# Patient Record
Sex: FEMALE | Race: BLACK OR AFRICAN AMERICAN
[De-identification: names, ages, dates, MRNs, and addresses within clinical notes are randomized per-mention and may not be internally consistent; named-entity substitution may affect disease eponyms.]

---

## 2020-10-12 ENCOUNTER — HOSPITAL ENCOUNTER (OUTPATIENT)
Dept: HOSPITAL 5 - TRG | Age: 20
Setting detail: OBSERVATION
LOS: 1 days | Discharge: HOME | End: 2020-10-13
Attending: OBSTETRICS & GYNECOLOGY | Admitting: OBSTETRICS & GYNECOLOGY
Payer: COMMERCIAL

## 2020-10-12 DIAGNOSIS — O46.8X3: Primary | ICD-10-CM

## 2020-10-12 DIAGNOSIS — Z3A.36: ICD-10-CM

## 2020-10-12 PROCEDURE — 85730 THROMBOPLASTIN TIME PARTIAL: CPT

## 2020-10-12 PROCEDURE — 85025 COMPLETE CBC W/AUTO DIFF WBC: CPT

## 2020-10-12 PROCEDURE — 86900 BLOOD TYPING SEROLOGIC ABO: CPT

## 2020-10-12 PROCEDURE — 36415 COLL VENOUS BLD VENIPUNCTURE: CPT

## 2020-10-12 PROCEDURE — 86850 RBC ANTIBODY SCREEN: CPT

## 2020-10-12 PROCEDURE — 86901 BLOOD TYPING SEROLOGIC RH(D): CPT

## 2020-10-12 PROCEDURE — 96361 HYDRATE IV INFUSION ADD-ON: CPT

## 2020-10-12 PROCEDURE — G0378 HOSPITAL OBSERVATION PER HR: HCPCS

## 2020-10-12 PROCEDURE — 96360 HYDRATION IV INFUSION INIT: CPT

## 2020-10-12 PROCEDURE — 85610 PROTHROMBIN TIME: CPT

## 2020-10-12 PROCEDURE — 80048 BASIC METABOLIC PNL TOTAL CA: CPT

## 2020-10-12 PROCEDURE — 85384 FIBRINOGEN ACTIVITY: CPT

## 2020-10-12 PROCEDURE — 96374 THER/PROPH/DIAG INJ IV PUSH: CPT

## 2020-10-12 PROCEDURE — 85460 HEMOGLOBIN FETAL: CPT

## 2020-10-13 VITALS — SYSTOLIC BLOOD PRESSURE: 120 MMHG | DIASTOLIC BLOOD PRESSURE: 78 MMHG

## 2020-10-13 LAB
APTT BLD: 28.7 SEC. (ref 24.2–36.6)
BASOPHILS # (AUTO): 0.1 K/MM3 (ref 0–0.1)
BASOPHILS NFR BLD AUTO: 0.6 % (ref 0–1.8)
BUN SERPL-MCNC: 5 MG/DL (ref 7–17)
BUN/CREAT SERPL: 13 %
CALCIUM SERPL-MCNC: 8.7 MG/DL (ref 8.4–10.2)
EOSINOPHIL # BLD AUTO: 0.7 K/MM3 (ref 0–0.4)
EOSINOPHIL NFR BLD AUTO: 6.8 % (ref 0–4.3)
FIBRINOGEN PPP-MCNC: 470 MG/DL (ref 211–480)
HCT VFR BLD CALC: 35.1 % (ref 30.3–42.9)
HEMOLYSIS INDEX: 0
HGB BLD-MCNC: 11.8 GM/DL (ref 10.1–14.3)
INR PPP: 0.99 (ref 0.87–1.13)
LYMPHOCYTES # BLD AUTO: 1.7 K/MM3 (ref 1.2–5.4)
LYMPHOCYTES NFR BLD AUTO: 17 % (ref 13.4–35)
MCHC RBC AUTO-ENTMCNC: 34 % (ref 30–34)
MCV RBC AUTO: 87 FL (ref 79–97)
MONOCYTES # (AUTO): 0.9 K/MM3 (ref 0–0.8)
MONOCYTES % (AUTO): 9.1 % (ref 0–7.3)
PLATELET # BLD: 226 K/MM3 (ref 140–440)
RBC # BLD AUTO: 4.05 M/MM3 (ref 3.65–5.03)

## 2020-10-13 RX ADMIN — SODIUM CHLORIDE, SODIUM LACTATE, POTASSIUM CHLORIDE, AND CALCIUM CHLORIDE SCH MLS/HR: .6; .31; .03; .02 INJECTION, SOLUTION INTRAVENOUS at 02:02

## 2020-10-13 RX ADMIN — SODIUM CHLORIDE, SODIUM LACTATE, POTASSIUM CHLORIDE, AND CALCIUM CHLORIDE SCH MLS/HR: .6; .31; .03; .02 INJECTION, SOLUTION INTRAVENOUS at 09:29

## 2020-10-13 NOTE — EVENT NOTE
Date: 10/13/20





OBS event note





Patient mated overnight for vaginal bleeding in third trimester.  Vaginal 

bleeding has since stopped.  Abruption labs including Kleihauer-Betke was 

negative.  Patient was discharged home in good condition.

## 2020-10-18 ENCOUNTER — HOSPITAL ENCOUNTER (INPATIENT)
Dept: HOSPITAL 5 - TRG | Age: 20
LOS: 3 days | Discharge: HOME | End: 2020-10-21
Attending: OBSTETRICS & GYNECOLOGY | Admitting: OBSTETRICS & GYNECOLOGY
Payer: COMMERCIAL

## 2020-10-18 DIAGNOSIS — Z23: ICD-10-CM

## 2020-10-18 DIAGNOSIS — D62: ICD-10-CM

## 2020-10-18 DIAGNOSIS — Z3A.37: ICD-10-CM

## 2020-10-18 DIAGNOSIS — Z20.828: ICD-10-CM

## 2020-10-18 LAB
ALBUMIN SERPL-MCNC: 4 G/DL (ref 3.9–5)
ALT SERPL-CCNC: 6 UNITS/L (ref 7–56)
BILIRUB UR QL STRIP: (no result)
BLOOD UR QL VISUAL: (no result)
BUN SERPL-MCNC: 10 MG/DL (ref 7–17)
BUN/CREAT SERPL: 20 %
CALCIUM SERPL-MCNC: 9.5 MG/DL (ref 8.4–10.2)
HCT VFR BLD CALC: 37 % (ref 30.3–42.9)
HEMOLYSIS INDEX: 41
HGB BLD-MCNC: 12.9 GM/DL (ref 10.1–14.3)
MCHC RBC AUTO-ENTMCNC: 35 % (ref 30–34)
MCV RBC AUTO: 87 FL (ref 79–97)
PH UR STRIP: 6 [PH] (ref 5–7)
PLATELET # BLD: 260 K/MM3 (ref 140–440)
PROT UR STRIP-MCNC: (no result) MG/DL
RBC # BLD AUTO: 4.26 M/MM3 (ref 3.65–5.03)
RBC #/AREA URNS HPF: 4 /HPF (ref 0–6)
UROBILINOGEN UR-MCNC: < 2 MG/DL (ref ?–2)
WBC #/AREA URNS HPF: 2 /HPF (ref 0–6)

## 2020-10-18 PROCEDURE — 80053 COMPREHEN METABOLIC PANEL: CPT

## 2020-10-18 PROCEDURE — 87806 HIV AG W/HIV1&2 ANTB W/OPTIC: CPT

## 2020-10-18 PROCEDURE — 83036 HEMOGLOBIN GLYCOSYLATED A1C: CPT

## 2020-10-18 PROCEDURE — 86850 RBC ANTIBODY SCREEN: CPT

## 2020-10-18 PROCEDURE — 86592 SYPHILIS TEST NON-TREP QUAL: CPT

## 2020-10-18 PROCEDURE — 81001 URINALYSIS AUTO W/SCOPE: CPT

## 2020-10-18 PROCEDURE — 85018 HEMOGLOBIN: CPT

## 2020-10-18 PROCEDURE — A6250 SKIN SEAL PROTECT MOISTURIZR: HCPCS

## 2020-10-18 PROCEDURE — 90715 TDAP VACCINE 7 YRS/> IM: CPT

## 2020-10-18 PROCEDURE — 83615 LACTATE (LD) (LDH) ENZYME: CPT

## 2020-10-18 PROCEDURE — 85014 HEMATOCRIT: CPT

## 2020-10-18 PROCEDURE — 36415 COLL VENOUS BLD VENIPUNCTURE: CPT

## 2020-10-18 PROCEDURE — 90471 IMMUNIZATION ADMIN: CPT

## 2020-10-18 PROCEDURE — 86803 HEPATITIS C AB TEST: CPT

## 2020-10-18 PROCEDURE — 85027 COMPLETE CBC AUTOMATED: CPT

## 2020-10-18 PROCEDURE — 86900 BLOOD TYPING SEROLOGIC ABO: CPT

## 2020-10-18 PROCEDURE — 84550 ASSAY OF BLOOD/URIC ACID: CPT

## 2020-10-18 PROCEDURE — 86706 HEP B SURFACE ANTIBODY: CPT

## 2020-10-18 PROCEDURE — 86762 RUBELLA ANTIBODY: CPT

## 2020-10-18 PROCEDURE — 86901 BLOOD TYPING SEROLOGIC RH(D): CPT

## 2020-10-18 RX ADMIN — SODIUM CHLORIDE, SODIUM LACTATE, POTASSIUM CHLORIDE, AND CALCIUM CHLORIDE SCH MLS/HR: .6; .31; .03; .02 INJECTION, SOLUTION INTRAVENOUS at 04:09

## 2020-10-18 RX ADMIN — Medication PRN APPLIC: at 10:22

## 2020-10-18 RX ADMIN — OXYCODONE AND ACETAMINOPHEN PRN TAB: 5; 325 TABLET ORAL at 17:00

## 2020-10-18 RX ADMIN — SODIUM CHLORIDE, SODIUM LACTATE, POTASSIUM CHLORIDE, AND CALCIUM CHLORIDE SCH MLS/HR: .6; .31; .03; .02 INJECTION, SOLUTION INTRAVENOUS at 10:22

## 2020-10-18 NOTE — ANESTHESIA DAY OF SURGERY
Anesthesia Day of Surgery





- Day of Surgery


Patient Examined: Yes


Patient H&P Reviewed: Yes


Patient is NPO: No (emergency)


Beta Blockers: No


Cardiac Clearance: No


Pulmonary Clearance: No


Juwan's Test: N/A

## 2020-10-18 NOTE — HISTORY AND PHYSICAL REPORT
<MARY NICHOLSON - Last Filed: 10/18/20 04:04>





History of Present Illness


Date of examination: 10/18/20


Date of admission: 


10/18/20 03:42





Chief complaint: 


Contractions with urge to push.





History of present illness: 


20 year old  at 37 weeks gestation presents to L&D with contractions and 

urge to push. Upon arrival to L&D patient is completely dilated and +1 station 

with BBOW.


Patient received prenatal care at [facility]; prenatal records are not 

available.


EDC 2020. 


No prenatal labs are available. Prenatal labs were drawn upon admission. 








Past History


Past Medical History: no pertinent history


Past Surgical History: no surgical history


GYN History: denies: chlamydia, gonorrhea, hepatitis B, hepatitis C, herpes, 

HIV, syphilis, trichomonas


Family/Genetic History: diabetes


Social history: lives with family, full code.  denies: smoking, alcohol abuse, 

IV drug use





- Obstetrical History


Expected Date of Delivery: 20


Actual Gestation: 37 Week(s) 0 Day(s) 


: 1


Para: 0


Hx # Term Pregnancies: 0


Number of  Pregnancies: 0


Spontaneous Abortions: 0


Induced : 0


Number of Living Children: 0





Medications and Allergies


                                    Allergies











Allergy/AdvReac Type Severity Reaction Status Date / Time


 


No Known Allergies Allergy   Verified 10/12/20 23:13











                                Home Medications











 Medication  Instructions  Recorded  Confirmed  Last Taken  Type


 


Prenatal One Daily Tablet 1 tab PO DAILY MDD 1 10/13/20 10/13/20 09/16/20 10:00 

History











Active Meds: 


Active Medications





Ephedrine Sulfate (Ephedrine Sulfate)  10 mg IV Q2M PRN


   PRN Reason: Hypotension


Lactated Ringer's (Lactated Ringers)  1,000 mls @ 125 mls/hr IV AS DIRECT SHEYLA


Oxytocin/Sodium Chloride (Pitocin/Ns 30 Unit/500ml)  30 units in 500 mls @ 40 

mls/hr IV TITR SHEYLA; Protocol


Ampicillin Sodium (Ampicillin/Ns 2 Gm/100 Ml)  2 gm in 100 mls @ 100 mls/hr IV 

ONCE ONE; Protocol


   Stop: 10/18/20 04:34


Mineral Oil (Mineral Oil)  30 ml PO QHS PRN


   PRN Reason: Constipation


Terbutaline Sulfate (Brethine)  0.25 mg SUB-Q ONCE PRN


   PRN Reason: Hyperstimulation/Hypertonicity











Review of Systems


All systems: negative (contractions and need to push)





- Vital Signs


Vital signs: 


                                   Vital Signs











Pulse Pulse Ox


 


 84   98 


 


 10/18/20 03:38  10/18/20 03:38








                                        











Temp Pulse Resp BP Pulse Ox


 


    86      140/94   98 


 


    10/18/20 03:39     10/18/20 03:39  10/18/20 03:38














- Physical Exam


Abdomen: Positive: normal appearance, soft.  Negative: distention, tenderness, 

guarding, rigidity


Genitourinary (Female): Positive: normal external genitalia, normal perenium.  

Negative: perineal/vulvar lesions (no lesions noted on careful exam with bright 

light upon admission)


Vagina: Positive: normal moisture


Uterus: Positive: normal size, enlarged.  Negative: tender


Anus/Rectum: Positive: normal perianal skin


Extremities: Positive: normal.  Negative: tenderness, edema





- Obstetrical


FHR: category 1


Uterine Contraction Monitor Mode: External


Cervical Dilatation: 10


Cervical Effacement Percentage: 100


Fetal station: +1


Uterine Contraction Pattern: Regular


Uterine Contraction Intensity: Strong/Firm





Results


All other labs normal.








Assessment and Plan


A: Pregnancy at 37 weeks gestation. 


Active advanced labor. 


GBS unknown. 


No prenatal records available. 


P: Admit. 


GBS prophylaxis. 


Request prenatal records. 


Draw prenatal labs


Anticipate vaginal birth. 








<BRENDANKATIEMARCELA ANYI - Last Filed: 10/18/20 05:52>





History of Present Illness


Date of admission: 


10/18/20 03:42








Medications and Allergies


Active Meds: 


Active Medications





Cefazolin Sodium (Ancef/Sterile Water 2 Gm/20 Ml)  2 gm IV PREOP NR


   Stop: 10/18/20 23:45


Diphenhydramine HCl (Benadryl)  12.5 mg IV Q2H PRN


   PRN Reason: Itching


Ephedrine Sulfate (Ephedrine Sulfate)  10 mg IV Q2M PRN


   PRN Reason: Hypotension


Hydromorphone HCl (Dilaudid)  0.5 mg IV Q4H PRN


   PRN Reason: breakthrough pain > 7/10


Lactated Ringer's (Lactated Ringers)  1,000 mls @ 125 mls/hr IV AS DIRECT SHEYLA


   Last Admin: 10/18/20 04:09 Dose:  125 mls/hr


   Documented by: 


Oxytocin/Sodium Chloride (Pitocin/Ns 30 Unit/500ml)  30 units in 500 mls @ 40 

mls/hr IV TITR SHEYLA; Protocol


Mineral Oil (Mineral Oil)  30 ml PO QHS PRN


   PRN Reason: Constipation


Nalbuphine HCl (Nalbuphine)  2.5 mg IV Q2H PRN


   PRN Reason: Itching


Naloxone HCl (Naloxone)  0.2 mg IV Q2MIN PRN


   PRN Reason: Res Rate </= 8 or 02 SAT < 92%


Ondansetron HCl (Zofran)  4 mg IV Q8H PRN


   PRN Reason: Nausea And Vomiting


Promethazine HCl (Phenergan)  25 mg PO Q6H PRN


   PRN Reason: Nausea And Vomiting


Promethazine HCl (Phenergan)  25 mg AL Q6H PRN


   PRN Reason: Nausea And Vomiting


Terbutaline Sulfate (Brethine)  0.25 mg SUB-Q ONCE PRN


   PRN Reason: Hyperstimulation/Hypertonicity











- Vital Signs


Vital signs: 


                                   Vital Signs











Pulse Pulse Ox


 


 84   98 


 


 10/18/20 03:38  10/18/20 03:38








                                        











Temp Pulse Resp BP Pulse Ox


 


 98.1 F   109 H     146/96   100 


 


 10/18/20 03:47  10/18/20 04:33     10/18/20 03:48  10/18/20 04:33














Results


Result Diagrams: 


                                10/18/20 Unknown





                                10/18/20 Unknown


                              Abnormal lab results











  10/18/20 10/18/20 Range/Units





  Unknown Unknown 


 


WBC  18.4 H   (4.5-11.0)  K/mm3


 


MCHC  35 H   (30-34)  %


 


RDW  13.1 L   (13.2-15.2)  %


 


Sodium   134 L  (137-145)  mmol/L


 


Chloride   97.8 L  ()  mmol/L


 


Carbon Dioxide   18 L  (22-30)  mmol/L


 


Creatinine   0.5 L  (0.6-1.2)  mg/dL


 


Glucose   102 H  ()  mg/dL


 


ALT   6 L  (7-56)  units/L


 


Alkaline Phosphatase   271 H  ()  units/L








All other labs normal.








Assessment and Plan





Pt upon ROM was found to be dimitri breech.  As a result, pt was verbally 

consulted by the staff and taken to the OR for emergency  for 

malpresentation.

## 2020-10-18 NOTE — ANESTHESIA CONSULTATION
Anesthesia Consult and Med Hx


Date of service: 10/18/20





- Airway


Anesthetic Teeth Evaluation: Good


ROM Head & Neck: Adequate


Mental/Hyoid Distance: Adequate


Mallampati Class: Class III


Intubation Access Assessment: Probably Good





- Pulmonary Exam


CTA: Yes





- Cardiac Exam


Cardiac Exam: RRR





- Pre-Operative Health Status


ASA Pre-Surgery Classification: ASA2, Emergency


Proposed Anesthetic Plan: General


Nerve Block: TAP





- Pulmonary


Hx Smoking: No


Hx Asthma: No


Hx Sleep Apnea: No





- Cardiovascular System


Hx Hypertension: No





- Central Nervous System


Hx Seizures: No


Hx Psychiatric Problems: No





- Gastrointestinal


Hx Gastroesophageal Reflux Disease: No





- Endocrine


Hx Renal Disease: No


Hx Hypothyroidism: No


Hx Hyperthyroidism: No





- Hematic


Hx Anemia: No


Hx Sickle Cell Disease: No





- Other Systems


Hx Alcohol Use: No

## 2020-10-18 NOTE — PROGRESS NOTE
Regional Anesthesia Block





- Regional Anesthesia Block


Start Time: 06:00


Stop Time: 06:10


Performed By:: JEANNETTE HINDS ([facility])


Procedure: 





Patient consented for TAP block for post surgical pain management.  Patient 

identified, monitors placed, and time out performed. Mid axillary TAP identified

bilaterally via ultrasound.  Skin prepped bilaterally with [chlorhexidine] and 

[20g stimuplex] needle advanced to the TAP.  25ml [Marcaine 0.20% with 20mcg 

Precedex and Decadron 2mg] injected under ultrasound guidance on the [left] 

side. 25ml [Marcaine 0.25% with 20mcg Precedex and Decadron 2mg] injected under 

ultrasound guidance on the [right] side.

## 2020-10-18 NOTE — PROCEDURE NOTE
OB Delivery Note





- Delivery


Date of Delivery: 10/18/20


Surgeon: VERONICA CARDONA


Estimated blood loss: other (800cc)





-  Section


Preop diagnosis: other malpresentation


Postop diagnosis: same


 section procedure:  section, primary low transverse


Disposition: PACU


Complications: none


Narrative: 





Indication:19 yo  at 37 weeks was completely dilated and dimitri breech.  

Patient taken for emergency  for malpresentation.  Anesthesia was 

general.





Findings:  Normal uterus, tubes and ovaries.  Clear fluid with some terminal 

mec.  No nuchal cord.  Fetus was dimitri breech.





Procedure: Patient taken to the operating room and prepped and draped in the 

usual fashion.  Pfannenstiel skin incision was made and carried down to the 

underlying fascia.  Fascia was incised and the incision was extended 

bilaterally.  Rectus fascia dissected off the rectus muscle both superiorly and 

inferiorly.  Peritoneum identified tented up and entered.  Peritoneal incision 

extended superiorly and inferiorly with good visualization of the bladder.  

Bladder blade was placed.  Uterine incision was made and the incision was 

extended bilaterally.





The baby was delivered from in the typical breech fashion.  Baby bulb suctioned 

after delivery.  Cord was delayed clamped and cut and handed off to waiting 

 team.





The placenta was delivered spontaneously.  The uterus was exteriorized and 

cleared of all clots and debris.  Uterine incision closed with 0 Vicryl in a 

running locked fashion followed by a second imbricating layer of 0 Vicryl.  Good

hemostasis was noted.  Her urine was clear.  Uterus tubes and ovaries were 

returned to the abdominal cavity.  Gutters were cleared of all clots and debris 

and the pelvis was well irrigated.  Good hemostasis noted.  Interceed placed 

over the uterine incision and over the lower uterine segment in the midline.  

Peritoneum was reapproximated with 2-0 Vicryl in a running fashion.  Attention 

was turned to the rectus fascia which was reapproximated with 0 Vicryl in a 

running fashion.  Subcutaneous tissue was irrigated and reapproximated with 2-0 

Vicryl in a running fashion.  Skin was closed with 4-0 Vicryl in a subcuticular 

fashion followed by Dermabond.  The procedure was concluded at this point and 

the patient tolerated the procedure well.  All instrument and lap counts were 

correct.











- Infant


  ** A


Apgar at 1 minute: 8


Apgar at 5 minutes: 9


Infant Gender: Male

## 2020-10-19 LAB
HCT VFR BLD CALC: 24.4 % (ref 30.3–42.9)
HGB BLD-MCNC: 8.2 GM/DL (ref 10.1–14.3)

## 2020-10-19 PROCEDURE — 3E0234Z INTRODUCTION OF SERUM, TOXOID AND VACCINE INTO MUSCLE, PERCUTANEOUS APPROACH: ICD-10-PCS | Performed by: OBSTETRICS & GYNECOLOGY

## 2020-10-19 RX ADMIN — OXYCODONE AND ACETAMINOPHEN PRN TAB: 5; 325 TABLET ORAL at 12:56

## 2020-10-19 RX ADMIN — IBUPROFEN PRN MG: 800 TABLET, FILM COATED ORAL at 05:21

## 2020-10-19 RX ADMIN — Medication SCH EACH: at 10:08

## 2020-10-19 RX ADMIN — FERROUS SULFATE TAB 325 MG (65 MG ELEMENTAL FE) SCH MG: 325 (65 FE) TAB at 20:41

## 2020-10-19 RX ADMIN — IBUPROFEN PRN MG: 800 TABLET, FILM COATED ORAL at 20:40

## 2020-10-19 RX ADMIN — FERROUS SULFATE TAB 325 MG (65 MG ELEMENTAL FE) SCH MG: 325 (65 FE) TAB at 12:56

## 2020-10-19 NOTE — PROGRESS NOTE
Assessment and Plan





A: S/P primary c/s


    Anemia





P: Continue monitoring


    Ferrous Sulfate TID


    Pin med prn


    Encourage ambulation





- Patient Problems


(1) S/P primary low transverse 


Current Visit: Yes   Status: Acute   





(2) Anemia


Current Visit: Yes   Status: Acute   





Subjective





- Subjective


Date of service: 10/19/20


Patient reports: appetite normal, voiding normally, pain well controlled, 

ambulating normally


[place]: doing well





Objective





- Vital Signs


Latest vital signs: 


                                   Vital Signs











  Temp Pulse Resp BP BP Pulse Ox


 


 10/19/20 08:37  98.1 F  90  18  121/84   98


 


 10/19/20 05:23  98.7 F  87  20  119/87   98


 


 10/19/20 05:21    16   


 


 10/19/20 00:29  98.0 F  103 H  20  114/74   97


 


 10/18/20 21:06  98.2 F  82  18  135/91   98


 


 10/18/20 17:00    20   


 


 10/18/20 16:13  97.9 F  89  17   121/79  96


 


 10/18/20 12:05  97.9 F  81  18   122/83  96








                                Intake and Output











 10/18/20 10/19/20 10/19/20





 22:59 06:59 14:59


 


Intake Total  480 360


 


Output Total 800 750 


 


Balance -800 -270 360


 


Intake:   


 


  Oral  120 360


 


  Intake, Free Water  360 


 


Output:   


 


  Urine 800 750 


 


    Indwelling Catheter 500  


 


    Void 300 750 


 


Other:   


 


  Total, Intake Amount  120 360


 


  Total, Output Amount 300 300 


 


  # Voids   


 


    Void   1














- Exam


Breasts: Present: normal


Abdomen: Present: normal appearance, soft, normal bowel sounds, other (passing 

gas)


Vulva: both: normal


Uterus: Present: normal, firm, fundal height below umbilicus


Extremities: Present: normal


Incision: Present: normal, dry, intact





- Labs


Labs: 


                              Abnormal lab results











  10/19/20 Range/Units





  04:37 


 


Hgb  8.2 L D  (10.1-14.3)  gm/dl


 


Hct  24.4 L D  (30.3-42.9)  %

## 2020-10-20 LAB
HCT VFR BLD CALC: 25.9 % (ref 30.3–42.9)
HGB BLD-MCNC: 9 GM/DL (ref 10.1–14.3)

## 2020-10-20 RX ADMIN — IBUPROFEN PRN MG: 800 TABLET, FILM COATED ORAL at 21:44

## 2020-10-20 RX ADMIN — FERROUS SULFATE TAB 325 MG (65 MG ELEMENTAL FE) SCH MG: 325 (65 FE) TAB at 09:54

## 2020-10-20 RX ADMIN — Medication PRN APPLIC: at 09:56

## 2020-10-20 RX ADMIN — IBUPROFEN PRN MG: 800 TABLET, FILM COATED ORAL at 05:17

## 2020-10-20 RX ADMIN — Medication SCH EACH: at 09:54

## 2020-10-20 RX ADMIN — FERROUS SULFATE TAB 325 MG (65 MG ELEMENTAL FE) SCH: 325 (65 FE) TAB at 21:36

## 2020-10-20 RX ADMIN — OXYCODONE AND ACETAMINOPHEN PRN TAB: 5; 325 TABLET ORAL at 09:54

## 2020-10-20 NOTE — PROGRESS NOTE
Assessment and Plan





- Patient Problems


(1) S/P primary low transverse 


Current Visit: Yes   Status: Acute   


Plan to address problem: 


Continue routine PP orders


 Keep incision site clean and dry


 Anticipate d/c home in 24-48 hrs if stable








(2) Anemia


Current Visit: Yes   Status: Acute   


Qualifiers: 


   Anemia type: other cause   Other causes of anemia: acute posthemorrhagic   

Qualified Code(s): D62 - Acute posthemorrhagic anemia   


Plan to address problem: 


Asymptomatic


 Continue daily oral iron supplementation as ordered


 Increase iron rich diet


 Repeat H/H in AM








Subjective





- Subjective


Date of service: 10/20/20


Principal diagnosis: S/P primary C/S; POD #2


Interval history: 





See admission H & P; OB operative summary and PP progress notes


Patient reports: appetite normal, voiding normally, pain well controlled (with 

medications), flatus, ambulating normally


[place]: doing well, bottle feeding (and attempting to breastfeed)





Objective





- Vital Signs


Latest vital signs: 


                                   Vital Signs











  Temp Pulse Resp BP BP Pulse Ox


 


 10/20/20 07:54  97.7 F  92 H  20  121/87   98


 


 10/20/20 01:22  97.9 F  98 H  16  109/72   97


 


 10/19/20 16:28  98.4 F  99 H  18   115/77  97








                                Intake and Output











 10/19/20 10/20/20 10/20/20





 23:59 07:59 15:59


 


Intake Total 960 220 240


 


Balance 960 220 240


 


Intake:   


 


  Oral 360  240


 


  Intake, Free Water 600 220 


 


Other:   


 


  Total, Intake Amount 120  240


 


  # Voids   


 


    Void 2 1 1














- Exam


Breasts: Present: normal


Cardiovascular: Present: Regular rate


Lungs: Present: Normal air movement


Abdomen: Present: soft


Uterus: Present: firm, fundal height below umbilicus (U-3)


Extremities: Present: normal


Deep Tendon Reflex Grade: Normal +2


Incision: Present: dressed (no shadow drainage or bleeding noted)

## 2020-10-21 VITALS — DIASTOLIC BLOOD PRESSURE: 83 MMHG | SYSTOLIC BLOOD PRESSURE: 117 MMHG

## 2020-10-21 LAB
HCT VFR BLD CALC: 24.7 % (ref 30.3–42.9)
HGB BLD-MCNC: 8.4 GM/DL (ref 10.1–14.3)

## 2020-10-21 RX ADMIN — Medication SCH EACH: at 08:21

## 2020-10-21 RX ADMIN — FERROUS SULFATE TAB 325 MG (65 MG ELEMENTAL FE) SCH MG: 325 (65 FE) TAB at 13:13

## 2020-10-21 RX ADMIN — FERROUS SULFATE TAB 325 MG (65 MG ELEMENTAL FE) SCH MG: 325 (65 FE) TAB at 08:21

## 2020-10-21 RX ADMIN — IBUPROFEN PRN MG: 800 TABLET, FILM COATED ORAL at 05:19

## 2020-10-21 NOTE — DISCHARGE SUMMARY
Providers





- Providers


Date of Admission: 


10/18/20 03:42





Date of discharge: 10/21/20


Attending physician: 


VERONICA CARDONA





Primary care physician: 


VERONICA CARDONA








Hospitalization


Reason for admission: active labor


Delivery: 


Procedure: primary low transverse


Episiotomy: none


Laceration: none


Incision: normal, dry, intact


Other postpartum procedures: none


Postpartum complications: none


Discharge diagnosis: IUP at term delivered


 baby: male


Condition at discharge: Good


Disposition: DC-01 TO HOME OR SELFCARE





Plan





- Discharge Medications


Prescriptions: 


Ibuprofen [Motrin 800 MG tab] 800 mg PO Q8H PRN #30 tablet


 PRN Reason: Pain, Mild (1-3)


oxyCODONE /ACETAMINOPHEN [Percocet 5/325 mg] 1 tab PO Q6H PRN #30 tablet


 PRN Reason: Pain, Moderate (4-6)





- Provider Discharge Summary


Activity: routine, no sex for 6 weeks, no heavy lifting 4 weeks, no strenuous 

exercise


Diet: routine


Instructions: routine


Additional instructions: 


[]  Smoking cessation referral if applicable(refer to patient education folder 

for contact #)


[]  Refer to [facility]'s [facility] Booklet








Call your doctor immediately for:


* Fever > 100.5


* Heavy vaginal bleeding ( >1 pad per hour)


* Severe persistent headache


* Shortness of breath


* Reddened, hot, painful area to leg or breast


* Drainage or odor from incision.





* Keep incision clean and dry at all times and follow doctor's instructions 

regarding bathing/showering











- Follow up plan


Follow up: 


VERONICA CARDONA MD [Primary Care Provider] - 7 Days

## 2020-10-21 NOTE — PROGRESS NOTE
Assessment and Plan





A: POD #3


    Asymptomatic Anemia





P: Follow routine Postop orders.


    Continue FeSO4 325 mg PO TID.


    Discharge today.





Subjective





- Subjective


Date of service: 10/21/20


Principal diagnosis: S/P primary C/S; POD #2


Patient reports: appetite normal, voiding normally, pain well controlled, 

ambulating normally


: doing well, bottle feeding (and breastfeeding)





Objective





- Vital Signs


Latest vital signs: 


                                   Vital Signs











  Temp Pulse Resp BP Pulse Ox


 


 10/21/20 07:47  97.7 F   17  132/84 


 


 10/21/20 00:21  99.0 F  89  20  126/83  98


 


 10/20/20 15:49  98.2 F  84  16  118/81  98








                                Intake and Output











 10/20/20 10/21/20 10/21/20





 22:59 06:59 14:59


 


Intake Total 240 240 


 


Balance 240 240 


 


Intake:   


 


  Oral 240 240 


 


Other:   


 


  Total, Intake Amount 240 240 


 


  # Voids   


 


    Void 1 1 














- Exam


Breasts: Present: normal


Cardiovascular: Present: Regular rate, Normal S1, Normal S2


Lungs: Present: Clear to auscultation, Normal air movement


Abdomen: Present: normal appearance, soft, normal bowel sounds


Uterus: Present: normal, firm, fundal height below umbilicus


Extremities: Present: normal


Incision: Present: normal, dry, intact





- Labs


Labs: 


                              Abnormal lab results











  10/18/20 10/18/20 10/20/20 Range/Units





  08:38 Unknown 18:18 


 


Hgb    9.0 L  (10.1-14.3)  gm/dl


 


Hct    25.9 L  (30.3-42.9)  %


 


Hemoglobin A1c  6.1 H    (4-6)  %


 


Lactate Dehydrogenase   241 H   ()  units/L














  10/21/20 Range/Units





  06:04 


 


Hgb  8.4 L  (10.1-14.3)  gm/dl


 


Hct  24.7 L  (30.3-42.9)  %


 


Hemoglobin A1c   (4-6)  %


 


Lactate Dehydrogenase   ()  units/L